# Patient Record
Sex: FEMALE | Race: BLACK OR AFRICAN AMERICAN
[De-identification: names, ages, dates, MRNs, and addresses within clinical notes are randomized per-mention and may not be internally consistent; named-entity substitution may affect disease eponyms.]

---

## 2018-03-26 ENCOUNTER — HOSPITAL ENCOUNTER (EMERGENCY)
Dept: HOSPITAL 53 - M ED | Age: 28
Discharge: HOME | End: 2018-03-26
Payer: COMMERCIAL

## 2018-03-26 DIAGNOSIS — J02.9: Primary | ICD-10-CM

## 2018-03-26 DIAGNOSIS — Z88.0: ICD-10-CM

## 2018-03-26 DIAGNOSIS — H92.03: ICD-10-CM

## 2018-03-26 DIAGNOSIS — R50.9: ICD-10-CM

## 2018-03-26 PROCEDURE — 87880 STREP A ASSAY W/OPTIC: CPT

## 2019-07-22 ENCOUNTER — HOSPITAL ENCOUNTER (EMERGENCY)
Dept: HOSPITAL 53 - M ED | Age: 29
LOS: 1 days | Discharge: HOME | End: 2019-07-23
Payer: COMMERCIAL

## 2019-07-22 VITALS — WEIGHT: 192.68 LBS | HEIGHT: 67 IN | BODY MASS INDEX: 30.24 KG/M2

## 2019-07-22 DIAGNOSIS — Z97.5: ICD-10-CM

## 2019-07-22 DIAGNOSIS — Z88.0: ICD-10-CM

## 2019-07-22 DIAGNOSIS — R10.30: Primary | ICD-10-CM

## 2019-07-22 LAB
ALBUMIN SERPL BCG-MCNC: 3.7 GM/DL (ref 3.2–5.2)
ALT SERPL W P-5'-P-CCNC: 21 U/L (ref 12–78)
B-HCG SERPL QL: NEGATIVE
BASOPHILS # BLD AUTO: 0 10^3/UL (ref 0–0.2)
BASOPHILS NFR BLD AUTO: 0.6 % (ref 0–1)
BILIRUB CONJ SERPL-MCNC: < 0.1 MG/DL (ref 0–0.2)
BILIRUB SERPL-MCNC: 0.2 MG/DL (ref 0.2–1)
BUN SERPL-MCNC: 10 MG/DL (ref 7–18)
CALCIUM SERPL-MCNC: 9.5 MG/DL (ref 8.5–10.1)
CHLAMYDIA DNA AMPLIFICATION: NEGATIVE
CHLORIDE SERPL-SCNC: 108 MEQ/L (ref 98–107)
CO2 SERPL-SCNC: 28 MEQ/L (ref 21–32)
CREAT SERPL-MCNC: 0.95 MG/DL (ref 0.55–1.3)
EOSINOPHIL # BLD AUTO: 0.1 10^3/UL (ref 0–0.5)
EOSINOPHIL NFR BLD AUTO: 1.4 % (ref 0–3)
GFR SERPL CREATININE-BSD FRML MDRD: > 60 ML/MIN/{1.73_M2} (ref 60–?)
GLUCOSE SERPL-MCNC: 89 MG/DL (ref 70–100)
HCT VFR BLD AUTO: 41 % (ref 36–47)
HGB BLD-MCNC: 13.5 G/DL (ref 12–15.5)
LIPASE SERPL-CCNC: 80 U/L (ref 73–393)
LYMPHOCYTES # BLD AUTO: 2.7 10^3/UL (ref 1.5–6.5)
LYMPHOCYTES NFR BLD AUTO: 41.1 % (ref 24–44)
MCH RBC QN AUTO: 30.8 PG (ref 27–33)
MCHC RBC AUTO-ENTMCNC: 32.9 G/DL (ref 32–36.5)
MCV RBC AUTO: 93.4 FL (ref 80–96)
MONOCYTES # BLD AUTO: 0.5 10^3/UL (ref 0–0.8)
MONOCYTES NFR BLD AUTO: 8.2 % (ref 0–5)
N GONORRHOEA RRNA SPEC QL NAA+PROBE: NEGATIVE
NEUTROPHILS # BLD AUTO: 3.2 10^3/UL (ref 1.8–7.7)
NEUTROPHILS NFR BLD AUTO: 48.4 % (ref 36–66)
PLATELET # BLD AUTO: 175 10^3/UL (ref 150–450)
POTASSIUM SERPL-SCNC: 4 MEQ/L (ref 3.5–5.1)
PROT SERPL-MCNC: 7.2 GM/DL (ref 6.4–8.2)
RBC # BLD AUTO: 4.39 10^6/UL (ref 4–5.4)
SODIUM SERPL-SCNC: 142 MEQ/L (ref 136–145)
WBC # BLD AUTO: 6.6 10^3/UL (ref 4–10)

## 2019-07-22 PROCEDURE — 84703 CHORIONIC GONADOTROPIN ASSAY: CPT

## 2019-07-22 PROCEDURE — 85025 COMPLETE CBC W/AUTO DIFF WBC: CPT

## 2019-07-22 PROCEDURE — 76830 TRANSVAGINAL US NON-OB: CPT

## 2019-07-22 PROCEDURE — 76856 US EXAM PELVIC COMPLETE: CPT

## 2019-07-22 PROCEDURE — 87210 SMEAR WET MOUNT SALINE/INK: CPT

## 2019-07-22 PROCEDURE — 99284 EMERGENCY DEPT VISIT MOD MDM: CPT

## 2019-07-22 PROCEDURE — 87591 N.GONORRHOEAE DNA AMP PROB: CPT

## 2019-07-22 PROCEDURE — 80076 HEPATIC FUNCTION PANEL: CPT

## 2019-07-22 PROCEDURE — 93976 VASCULAR STUDY: CPT

## 2019-07-22 PROCEDURE — 83690 ASSAY OF LIPASE: CPT

## 2019-07-22 PROCEDURE — 84702 CHORIONIC GONADOTROPIN TEST: CPT

## 2019-07-22 PROCEDURE — 87491 CHLMYD TRACH DNA AMP PROBE: CPT

## 2019-07-22 PROCEDURE — 80048 BASIC METABOLIC PNL TOTAL CA: CPT

## 2019-07-22 PROCEDURE — 81001 URINALYSIS AUTO W/SCOPE: CPT

## 2019-07-22 PROCEDURE — 74177 CT ABD & PELVIS W/CONTRAST: CPT

## 2019-07-22 PROCEDURE — 36415 COLL VENOUS BLD VENIPUNCTURE: CPT

## 2019-07-22 NOTE — REPVR
EXAM: 

 US Pelvis Complete, Transabdominal and US Pelvis, Transvaginal 



EXAM DATE/TIME: 

 7/22/2019 10:02 PM 



CLINICAL HISTORY: 

 29 years old, female; Pelvic pain; Additional info: Pelvic llq pain R/O 

torsion 



TECHNIQUE: 

 Imaging protocol: Real-time transabdominal and transvaginal pelvic ultrasound 

(complete) with image documentation. Transvaginal imaging was used for better 

evaluation of the endometrium and adnexa. 



COMPARISON: 

 No relevant prior studies available. 



FINDINGS: 

 Uterus/cervix: Uterus measures 9.1 x 4.1 x 6.2 cm. Endometrial echocomplex 

measures 10.6 mm. IUD demonstrated centrally within the endometrial cavity. 

 Right adnexa: Right ovary measures 3.7 x 2.2 x 3 cm. Resistive index 0.5 to 

 Left adnexa: Left ovary measures 2.8 x 1.6 x 2.4 cm. Resistive index 0.48. 

 Free fluid: None. 

 Bladder: Bladder unremarkable. 



IMPRESSION: 

No acute findings. IUD demonstrated within the endometrial cavity.



Electronically signed by: Kalia Garcias On 07/22/2019  22:38:26 PM

## 2019-07-22 NOTE — REPVR
EXAM: 

 CT Abdomen and Pelvis With Contrast 



EXAM DATE/TIME: 

 7/22/2019 11:09 PM 



CLINICAL HISTORY: 

 29 years old, female; Abdominal pain; Localized; Lower; Additional info: Llq 

pain 



TECHNIQUE: 

 Imaging protocol: Axial computed tomography images of the abdomen and pelvis 

with intravenous contrast. Coronal and sagittal reformatted images were created 

and reviewed. 

 Radiation optimization: All CT scans at this facility use at least one of 

these dose optimization techniques: automated exposure control; mA and/or kV 

adjustment per patient size (includes targeted exams where dose is matched to 

clinical indication); or iterative reconstruction. 

 Contrast material: ISO; Contrast volume: 100 ml; Contrast route: AC; 



COMPARISON: 

 US PELVIC NON-OB COMPLETE 7/22/2019 9:42 PM 



FINDINGS: 



 Liver: 5 mm cyst left lobe liver. Remainder of the liver unremarkable. 

 Gallbladder and bile ducts: Normal. No calcified stones. No ductal dilation. 

 Pancreas: Normal. No ductal dilation. 

 Spleen: Normal. No splenomegaly. 

 Adrenals: Normal. No mass. 

 Kidneys and ureters: Normal. No hydronephrosis. 

 Stomach and bowel: There is increased feces throughout the colon consistent 

with constipation. 

 Appendix: No evidence of appendicitis. 

 Intraperitoneal space: Normal. No free air. No significant fluid collection. 

 Vasculature: Normal. No abdominal aortic aneurysm. 

 Lymph nodes: Normal. No enlarged lymph nodes. 



 Bladder: Unremarkable as visualized. 

 Reproductive: IUD demonstrated within the uterus. 



 Bones/joints: No acute fracture. No dislocation. 

 Soft tissues: Unremarkable. 



IMPRESSION: 

1. There is increased feces throughout the colon consistent with constipation. 

2. No acute findings. 



COMMENT: 

 Consistent with the [employer]'s Incidental Findings 

Committee Report (J Am Raven Radiol 2010): Unless the patient's specific 

circumstances suggest otherwise, any liver lesion 0.5 cm or less, any cystic 

kidney lesion less than 1.0 cm, and/or any adrenal lesion 1.0 cm or less not 

otherwise characterized in this report as possessing suspicious or 

indeterminate imaging features is/are highly likely to be benign and do not 

require follow-up imaging or biopsy. 



Electronically signed by: Kalia Garcias On 07/22/2019  23:45:37 PM

## 2019-07-23 VITALS — DIASTOLIC BLOOD PRESSURE: 77 MMHG | SYSTOLIC BLOOD PRESSURE: 128 MMHG

## 2019-08-29 ENCOUNTER — HOSPITAL ENCOUNTER (EMERGENCY)
Dept: HOSPITAL 53 - M ED | Age: 29
Discharge: HOME | End: 2019-08-29
Payer: COMMERCIAL

## 2019-08-29 VITALS — BODY MASS INDEX: 30.86 KG/M2 | HEIGHT: 66 IN | WEIGHT: 192.02 LBS

## 2019-08-29 VITALS — SYSTOLIC BLOOD PRESSURE: 134 MMHG | DIASTOLIC BLOOD PRESSURE: 84 MMHG

## 2019-08-29 DIAGNOSIS — B35.3: Primary | ICD-10-CM

## 2019-08-29 DIAGNOSIS — Z79.3: ICD-10-CM

## 2019-08-29 DIAGNOSIS — Z88.0: ICD-10-CM
